# Patient Record
Sex: MALE | Race: WHITE
[De-identification: names, ages, dates, MRNs, and addresses within clinical notes are randomized per-mention and may not be internally consistent; named-entity substitution may affect disease eponyms.]

---
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
## 2017-08-09 ENCOUNTER — HOSPITAL ENCOUNTER (EMERGENCY)
Dept: HOSPITAL 5 - ED | Age: 27
Discharge: HOME | End: 2017-08-09
Payer: SELF-PAY

## 2017-08-09 VITALS — SYSTOLIC BLOOD PRESSURE: 142 MMHG | DIASTOLIC BLOOD PRESSURE: 90 MMHG

## 2017-08-09 DIAGNOSIS — F17.200: ICD-10-CM

## 2017-08-09 DIAGNOSIS — K02.9: Primary | ICD-10-CM

## 2017-08-09 DIAGNOSIS — J45.909: ICD-10-CM

## 2017-08-09 PROCEDURE — 99282 EMERGENCY DEPT VISIT SF MDM: CPT

## 2018-02-22 ENCOUNTER — HOSPITAL ENCOUNTER (EMERGENCY)
Dept: HOSPITAL 5 - ED | Age: 28
LOS: 1 days | Discharge: HOME | End: 2018-02-23
Payer: SELF-PAY

## 2018-02-22 DIAGNOSIS — L03.012: Primary | ICD-10-CM

## 2018-02-22 DIAGNOSIS — F17.200: ICD-10-CM

## 2018-02-22 DIAGNOSIS — J45.909: ICD-10-CM

## 2018-02-22 PROCEDURE — 90471 IMMUNIZATION ADMIN: CPT

## 2018-02-22 PROCEDURE — 90715 TDAP VACCINE 7 YRS/> IM: CPT

## 2018-02-23 VITALS — DIASTOLIC BLOOD PRESSURE: 63 MMHG | SYSTOLIC BLOOD PRESSURE: 114 MMHG

## 2018-02-23 NOTE — EMERGENCY DEPARTMENT REPORT
Abscess Boil HPI





- HPI


Chief Complaint: Skin/Abscess/Foreign Body


Stated Complaint: FINGER SWOLLEN AND PUSS PUSS COMING OUT


Time Seen by Provider: 02/23/18 00:54


Duration: 3 Days


Location: Upper Extremity


Severity: Mild


History: Yes Pain, Yes Purulent Drainage, No Fever, No Numbness, No Foreign Body

, No Previous History, No Insect Bite


HPI: This is a 27-year-old male that presents with left index paronychia.  

Patient stated paronychia and has been draining the past day and now it is 

sore.  Patient denies any numbness, tingling, fever, chills, nausea, vomiting, 

chest pain or shortness of breath.  Patient denies any trauma.  Patient denies 

any allergies or significant past medical history besides hypertension.


Home Medications: 


 Previous Rx's











 Medication  Instructions  Recorded  Last Taken  Type


 


Acetaminophen/Codeine [Tylenol #3] 1 tab PO Q6H PRN #7 tab 08/09/17 Unknown Rx


 


Amoxicillin 500 mg PO BID #20 capsule 08/09/17 Unknown Rx


 


Ibuprofen [Motrin] 600 mg PO Q8H PRN #20 tablet 08/09/17 Unknown Rx


 


Ibuprofen [Motrin] 600 mg PO Q8H PRN #30 tablet 02/23/18 Unknown Rx


 


Sulfamethoxazole/Trimethoprim 1 each PO BID #14 tablet 02/23/18 Unknown Rx





[Bactrim DS TAB]    











Allergies/Adverse Reactions: 


 Allergies











Allergy/AdvReac Type Severity Reaction Status Date / Time


 


No Known Allergies Allergy   Verified 02/22/18 22:00














ED Review of Systems


ROS: 


Stated complaint: FINGER SWOLLEN AND PUSS PUSS COMING OUT


Other details as noted in HPI





Constitutional: denies: chills, fever


Eyes: denies: eye pain, eye discharge, vision change


ENT: denies: ear pain, throat pain


Respiratory: denies: cough, shortness of breath, wheezing


Cardiovascular: denies: chest pain, palpitations


Endocrine: no symptoms reported


Gastrointestinal: denies: abdominal pain, nausea, diarrhea


Genitourinary: denies: urgency, dysuria


Musculoskeletal: denies: back pain, joint swelling, arthralgia


Skin: denies: rash, lesions


Neurological: denies: headache, weakness, paresthesias


Psychiatric: denies: anxiety, depression


Hematological/Lymphatic: denies: easy bleeding, easy bruising





ED Past Medical Hx





- Past Medical History


Hx Hypertension: Yes


Hx Asthma: Yes


Additional medical history: HEART MURMUR





- Surgical History


Additional Surgical History: head surgery?





- Social History


Smoking Status: Current Some Day Smoker


Substance Use Type: None





- Medications


Home Medications: 


 Home Medications











 Medication  Instructions  Recorded  Confirmed  Last Taken  Type


 


Acetaminophen/Codeine [Tylenol #3] 1 tab PO Q6H PRN #7 tab 08/09/17  Unknown Rx


 


Amoxicillin 500 mg PO BID #20 capsule 08/09/17  Unknown Rx


 


Ibuprofen [Motrin] 600 mg PO Q8H PRN #20 tablet 08/09/17  Unknown Rx


 


Ibuprofen [Motrin] 600 mg PO Q8H PRN #30 tablet 02/23/18  Unknown Rx


 


Sulfamethoxazole/Trimethoprim 1 each PO BID #14 tablet 02/23/18  Unknown Rx





[Bactrim DS TAB]     














ED Abscess Boil Physical Exam





- Exam


General: 


Vital signs noted. No distress. Alert and acting appropriately.





GENERAL: The patient is a well-developed, well-nourished in no apparent 

distress. Patient is alert and acting appropriately for age.  Alert and 

oriented 3, no apparent distress, normal gait, atraumatic.





HEENT: Head is normocephalic and atraumatic. PERRL, Extraocular muscles are 

intact. Pupils are equal, round, and reactive to light and accommodation. Nares 

appeared normal. Mouth is well hydrated and without lesions. Mucous membranes 

are moist. Posterior pharynx clear of any exudate or lesions.  Mouth is well 

hydrated and without lesions.  Tonsils not erythematous or swollen.  Uvula 

midline.  Tongue elevated.  Mucous members are moist.  Posterior pharynx clear, 

no exudate or lesions.  Patent airways.


 


NECK: Supple. No carotid bruits. No lymphadenopathy or thyromegaly.nontender. 

No meningitic signs are noted.


 


LUNGS: Clear to auscultation. Non labor breathing. No intercostal retractions.  

Symmetrical with respiration, no wheezing, no rales, or crackles.


 


HEART: Regular rate and rhythm without murmur, rubs or gallops. No 

reproducible.  S1, S2 present, regular rate and rhythm without murmur, no rubs, 

no gallops.


 


ABDOMEN: Soft, nontender, and nondistended.  Positive bowel sounds.  No 

hepatosplenomegaly was noted. No guarding or rebound tenderness, negative 

epigastric bruit. Negative psoas sign, negative monterroso sign, negative McBurneys 

sign


 


EXTREMITIES: Without any cyanosis, clubbing, rash, lesions or edema. Peripheral 

pulses intact. Capillary refill less than 2 seconds.  Full range of motion 

bilaterally.


 


NEUROLOGIC: Cranial nerves II through XII are grossly intact. Alert and 

oriented x 3. Normal gait. Symmetrical strength and sensation. Reflexes 2+ 

throughout. Cerebellar testing normal. GCS score of 15.


 


PSYCHIATRIC: Normal affect with no suicidal or homicidal ideations.





Skin: Left index finger paronychia with slight purulent drainage.  Positive 

fluctuance and induration.


Exam: Yes Fluctuance, Yes Normal Neurologic Exam, Yes Normal Circulation, No 

Tenderness, No Surrounding Cellulites/Erythema, No Lymphangitis, No Crepitation

, No Heart Murmur





I & D Note





- I & D Note


I & D Note: Under sterile field, I used Betadine to cleanse the area. I then 

used an 11 blade to open the cuticle area.  About 1 mL of purulent drainage has 

been noted.  I then used sterile 0.9% normal saline flush to flush the wound 

with total volume of 40 mL used.   A sterile 4 x 4 with tape has been applied 

as dressing.  Bleeding is under control.  Patient tolerated the procedure well 

with no signs of distress noted.





ED Course


 Vital Signs











  02/22/18





  22:01


 


Temperature 98.3 F


 


Pulse Rate 76


 


Respiratory 16





Rate 


 


Blood Pressure 110/67


 


O2 Sat by Pulse 96





Oximetry 














- Reevaluation(s)


Reevaluation #1: 





02/23/18 01:14


Patient is speaking in full sentences with no signs of distress noted.


Critical care attestation.: 


If time is entered above; I have spent that time in minutes in the direct care 

of this critically ill patient, excluding procedure time.








ED Medical Decision Making





- Medical Decision Making





19-year-old male that presents with paronychia.  Patient is stable and was 

examined by me.  Patient received tetanus and Motrin in the ED.  Paronychia was 

drained with 11 blade.  Patient received Bactrim and discharged.  Patient was 

instructed to soak finger with soap and water that is warm.  At time of 

discharge, the patient does not seem toxic or ill in appearance.  No acute 

signs of distress noted.  Patient agrees to discharge treatment plan of care.  

No further questions noted by the patient.





ED Disposition


Clinical Impression: 


 Paronychia





Disposition: DC-01 TO HOME OR SELFCARE


Is pt being admited?: No


Does the pt Need Aspirin: No


Condition: Stable


Instructions:  Sulfamethoxazole/Trimethoprim (By mouth), Paronychia (ED)


Additional Instructions: 


Follow-up with a primary care doctor in 3-5 days or if symptoms worsen and 

continue return to emergency room as soon as possible. 


Soak finger with warm soap and water as much as possible.


Prescriptions: 


Ibuprofen [Motrin] 600 mg PO Q8H PRN #30 tablet


 PRN Reason: Pain


Sulfamethoxazole/Trimethoprim [Bactrim DS TAB] 1 each PO BID #14 tablet


Referrals: 


PRIMARY CARE,MD [Primary Care Provider] - 3-5 Days


BLAINE QUINN MD [Staff Physician] - 3-5 Days


[facility] [Outside] - 3-5 Days


[facility] [Outside] - 3-5 Days


Forms:  Work/School Release Form(ED)

## 2018-08-18 ENCOUNTER — HOSPITAL ENCOUNTER (EMERGENCY)
Dept: HOSPITAL 5 - ED | Age: 28
Discharge: HOME | End: 2018-08-18
Payer: SELF-PAY

## 2018-08-18 VITALS — SYSTOLIC BLOOD PRESSURE: 110 MMHG | DIASTOLIC BLOOD PRESSURE: 60 MMHG

## 2018-08-18 DIAGNOSIS — E16.2: ICD-10-CM

## 2018-08-18 DIAGNOSIS — I10: ICD-10-CM

## 2018-08-18 DIAGNOSIS — F17.200: ICD-10-CM

## 2018-08-18 DIAGNOSIS — J45.909: ICD-10-CM

## 2018-08-18 DIAGNOSIS — K52.9: Primary | ICD-10-CM

## 2018-08-18 LAB
ALBUMIN SERPL-MCNC: 3.9 G/DL (ref 3.9–5)
ALT SERPL-CCNC: 7 UNITS/L (ref 7–56)
BASOPHILS # (AUTO): 0 K/MM3 (ref 0–0.1)
BASOPHILS NFR BLD AUTO: 0.5 % (ref 0–1.8)
BILIRUB UR QL STRIP: (no result)
BLOOD UR QL VISUAL: (no result)
BUN SERPL-MCNC: 13 MG/DL (ref 9–20)
BUN/CREAT SERPL: 16 %
CALCIUM SERPL-MCNC: 9.2 MG/DL (ref 8.4–10.2)
EOSINOPHIL # BLD AUTO: 0.2 K/MM3 (ref 0–0.4)
EOSINOPHIL NFR BLD AUTO: 4.6 % (ref 0–4.3)
HCT VFR BLD CALC: 39.6 % (ref 35.5–45.6)
HEMOLYSIS INDEX: 27
HGB BLD-MCNC: 13.7 GM/DL (ref 11.8–15.2)
LIPASE SERPL-CCNC: 22 UNITS/L (ref 13–60)
LYMPHOCYTES # BLD AUTO: 1.5 K/MM3 (ref 1.2–5.4)
LYMPHOCYTES NFR BLD AUTO: 29.8 % (ref 13.4–35)
MCH RBC QN AUTO: 31 PG (ref 28–32)
MCHC RBC AUTO-ENTMCNC: 35 % (ref 32–34)
MCV RBC AUTO: 88 FL (ref 84–94)
MONOCYTES # (AUTO): 0.6 K/MM3 (ref 0–0.8)
MONOCYTES % (AUTO): 11.1 % (ref 0–7.3)
MUCOUS THREADS #/AREA URNS HPF: (no result) /HPF
PH UR STRIP: 5 [PH] (ref 5–7)
PLATELET # BLD: 193 K/MM3 (ref 140–440)
PROT UR STRIP-MCNC: (no result) MG/DL
RBC # BLD AUTO: 4.49 M/MM3 (ref 3.65–5.03)
RBC #/AREA URNS HPF: 1 /HPF (ref 0–6)
UROBILINOGEN UR-MCNC: < 2 MG/DL (ref ?–2)
WBC #/AREA URNS HPF: 2 /HPF (ref 0–6)

## 2018-08-18 PROCEDURE — 81001 URINALYSIS AUTO W/SCOPE: CPT

## 2018-08-18 PROCEDURE — 80053 COMPREHEN METABOLIC PANEL: CPT

## 2018-08-18 PROCEDURE — 83690 ASSAY OF LIPASE: CPT

## 2018-08-18 PROCEDURE — 36415 COLL VENOUS BLD VENIPUNCTURE: CPT

## 2018-08-18 PROCEDURE — 99283 EMERGENCY DEPT VISIT LOW MDM: CPT

## 2018-08-18 PROCEDURE — 82962 GLUCOSE BLOOD TEST: CPT

## 2018-08-18 PROCEDURE — 85025 COMPLETE CBC W/AUTO DIFF WBC: CPT

## 2018-08-18 NOTE — EMERGENCY DEPARTMENT REPORT
Vomiting/Diarrhea





- HPI


Chief Complaint: Nausea/Vomiting/Diarrhea


Stated Complaint: THORWING UP 2 DAYS


Time Seen by Provider: 08/18/18 17:41


Duration: 2 Days


Severity: mild


Nausea/Vomiting Severity: Mild


Diarrhea Severity: None


Pain Location: Generalized


Pain Severity: Mild


Symptoms: Yes Able to Tolerate Fluids, No Watery Diarrhea, No Bloody diarrhea, 

No Fever, No Recent Unusual Foods, No Recent Untreated Water, No Recent use of 

Antibiotics, No Family w/ Similar Symptoms, No Contacts w/ Similar Symptoms, No 

Rash, No Hematuria, No Recent URI Symptoms


Other History: This is a 27-year-old -American male who presents with 

nausea and vomiting for 2 days.  Patient states he ate something a few days ago 

that didn't agree with his primary and then secured since.  He is currently 

taken Tylenol and Aleve for no improvement in symptoms.  Patient states pain is 

sharp and lasts for 3-4 hours when it occurs.  He reports pain shortly after 

eating or sitting for long periods of times.  He reports the last episode of 

nausea and vomiting around 6:00 this morning.  He was able to eat breakfast and 

hold it down.  He is also complaining of some low back pain that is worse with 

bending.  He states he feels like something is stabbing him in the back.  He 

denies dysuria, frequency, urgency, burning sensation, chest pain, and diarrhea.





ED Review of Systems


ROS: 


Stated complaint: THORWING UP 2 DAYS


Other details as noted in HPI





Constitutional: denies: chills, fever


Respiratory: denies: cough, shortness of breath, wheezing


Cardiovascular: denies: chest pain, palpitations


Gastrointestinal: nausea, vomiting.  denies: abdominal pain, diarrhea


Musculoskeletal: back pain (low back pain).  denies: joint swelling, arthralgia

, myalgia


Neurological: denies: headache, weakness, paresthesias


Psychiatric: denies: anxiety, depression





ED Past Medical Hx





- Past Medical History


Previous Medical History?: Yes


Hx Hypertension: Yes


Hx Asthma: Yes


Additional medical history: HEART MURMUR





- Surgical History


Past Surgical History?: Yes


Additional Surgical History: head surgery?





- Social History


Smoking Status: Current Every Day Smoker


Substance Use Type: Non Opiate Pain





- Medications


Home Medications: 


 Home Medications











 Medication  Instructions  Recorded  Confirmed  Last Taken  Type


 


Acetaminophen/Codeine [Tylenol #3] 1 tab PO Q6H PRN #7 tab 08/09/17  Unknown Rx


 


Amoxicillin 500 mg PO BID #20 capsule 08/09/17  Unknown Rx


 


Ibuprofen [Motrin] 600 mg PO Q8H PRN #20 tablet 08/09/17  Unknown Rx


 


Ibuprofen [Motrin] 600 mg PO Q8H PRN #30 tablet 02/23/18  Unknown Rx


 


Sulfamethoxazole/Trimethoprim 1 each PO BID #14 tablet 02/23/18  Unknown Rx





[Bactrim DS TAB]     


 


Ondansetron [Zofran Odt] 4 mg PO TID PRN #8 tab.rapdis 08/18/18  Unknown Rx


 


Ranitidine HCl [Zantac 150 MG TAB] 150 mg PO BID #14 tablet 08/18/18  Unknown Rx














Vomiting Diarrhea Exam





- Exam


General: 


Vital signs noted. No distress. Alert and acting appropriately.





HEENT: Yes Moist Mucous Membranes, No Pharyngeal Erythema, No Pharyngeal 

Exudates, No Rhinorrhea, No Conjuctival Injection, No Frontal Tenderness, No 

Maxillary Tenderness


Neck: No Adenopathy, No Rigidity


Lungs: Yes Clear Lung Sounds, Yes Good Air Exchange, No Wheezes, No Stridor, No 

Cough, No Nasal Flaring, No Retractions, No Use of Accessory Muscles


Heart exam: Regular: Yes, Murmur: No, Tachycardia: No


Abdomen: Tenderness: No, Peritoneal Signs: No, Distention: No, Hyperactive 

Bowel sounds: No


Skin exam: Rash: No, Edema: No, Normal turgor: Yes


Neurologic: 


Alert and oriented, no deficits.








Musculoskeletal: 


Unremarkable.











ED Course


 Vital Signs











  08/18/18





  15:02


 


Temperature 98 F


 


Pulse Rate 78


 


Respiratory 18





Rate 


 


Blood Pressure 104/62


 


O2 Sat by Pulse 95





Oximetry 














ED Medical Decision Making





- Lab Data


Result diagrams: 


 08/18/18 18:30





 08/18/18 18:30





- Medical Decision Making





Patient is stable and was examined by me. Vitals stable. Obtained CMP, CBC, 

lipase, & UA.  Glucose low, ordered point of care glucose 126.  Normal physical 

exam. Plan to start  zofran and Zantac for gastroenteritis. Discussed plan with 

patient and agreed to plan. No further questions noted by the patient. 

Discharged home in stable condition. Follow up with PCP in 2-3 days.


Critical care attestation.: 


If time is entered above; I have spent that time in minutes in the direct care 

of this critically ill patient, excluding procedure time.








ED Disposition


Clinical Impression: 


 Nausea and vomiting in adult, Gastroenteritis





Disposition: DC-01 TO HOME OR SELFCARE


Is pt being admited?: No


Does the pt Need Aspirin: No


Condition: Stable


Instructions:  Acute Nausea and Vomiting (ED), Gastroenteritis (ED)


Additional Instructions: 


Frequent hand washing is important to reduce spread.





Prompt disinfection of contaminated surfaces with household chlorine bleach-

based  and washing of soiled clothing and bedding should be advised.





If food or water is thought to be contaminated, it should be avoided.





Increase fluid intake.





Drinks high in sugars such as carbonated soft drinks, fruit juice, and highly 

sugared liquids should be avoided.


Prescriptions: 


Ondansetron [Zofran Odt] 4 mg PO TID PRN #8 tab.rapdis


 PRN Reason: Nausea And Vomiting


Ranitidine HCl [Zantac 150 MG TAB] 150 mg PO BID #14 tablet


Referrals: 


[facility] [Outside] - 3-5 Days


[facility] [Outside] - 3-5 Days


[facility] [Outside] - 3-5 Days


Time of Disposition: 19:20


Print Language: ENGLISH